# Patient Record
(demographics unavailable — no encounter records)

---

## 2024-12-18 NOTE — HISTORY OF PRESENT ILLNESS
[FreeTextEntry1] : Ms. CHRISTINE is a 65 year old female who returns with regard to a history of osteoporosis diagnosed in 2018 which has been worsening. No prior hx of using any pharmacotherapy. Prior following with GYN, Dr. Suazo  DEXA from 8/5/24 spine -2.5 (prior -2.4 left hip -2.8 ( prior -2.7) right hip -2.4 (prior -2.4)   FRAX major 15% and hip 5.8%  On Vitamin D3 x about 2 months-5,000 IU  Patient denies bony adult fractures, malabsorption disorders such as IBD or celiac's, prolonged immobilization, use of PPIs , history of hyperthyroidism. Denies drug use. Denies hypercalcemia, kidney stones or primary hyperparathyroidism.  She is active with walking, running, yoga, strength training on a daily basis No loss of height Menopause at age 50 She is taking vitamin d3 daily Diet is high in calcium.  She uses Trelegy inhaler daily for COPD . No other steroid use  Saw dentist within last 6 months, no major dental work upcoming.  Additional medical history includes that of anxiety, COPD, arthritis. Medications: Pepcid 20 mg, vitamin b12, vitamin D3, Trelegy  SHx: Prior smoker for 40 years quit in 2023

## 2025-03-26 NOTE — HISTORY OF PRESENT ILLNESS
[FreeTextEntry1] : Ms. CHRISTINE is a 66-year-old female who returns with regard to a history of osteoporosis diagnosed in 2018 which has been worsening. No prior hx of using any pharmacotherapy. Prior following with GYN, Dr. Jaimes.  Additional medical history includes that of anxiety, COPD, arthritis, vitamin D deficiency  DEXA from 8/5/24  Spine: -2.5  Left Femoral neck: -2.8 Left Total Hip: -2.2 Right Femoral neck: -2.4 Right total hip: -1.9 Total Hip mean: -2.1  FRAX major: 15% and hip: 5.8%  Takes vitamin D intermittently.  Patient denies bony adult fractures, malabsorption disorders such as IBD or celiac's, prolonged immobilization, use of PPIs, history of hyperthyroidism. Denies drug use. Denies hypercalcemia, kidney stones or primary hyperparathyroidism.  She is active with walking, running, yoga, strength training on a daily basis for 2 hours. No loss of height. Menopause at age 50. She is taking vitamin d3 daily Diet is high in calcium.  She uses Trelegy inhaler daily for COPD. No other steroid use.  Saw dentist within last 6 months, no major dental work upcoming. ____________________________________________________________________________________________________  Medications: vitamin D3, Trelegy, Albuterol, Famotidine  SHx: Prior smoker for 40 years quit in 2023 but restarted lately, smoking 2 cigarettes daily